# Patient Record
Sex: FEMALE | NOT HISPANIC OR LATINO | URBAN - METROPOLITAN AREA
[De-identification: names, ages, dates, MRNs, and addresses within clinical notes are randomized per-mention and may not be internally consistent; named-entity substitution may affect disease eponyms.]

---

## 2017-06-12 ENCOUNTER — IMPORTED ENCOUNTER (OUTPATIENT)
Dept: URBAN - METROPOLITAN AREA CLINIC 1 | Facility: CLINIC | Age: 72
End: 2017-06-12

## 2017-06-12 PROBLEM — E11.9: Noted: 2017-06-12

## 2017-06-12 PROBLEM — H40.023: Noted: 2017-06-12

## 2017-06-12 PROBLEM — H25.813: Noted: 2017-06-12

## 2017-06-12 PROBLEM — H04.123: Noted: 2017-06-12

## 2017-06-12 PROBLEM — Z79.4: Noted: 2017-06-12

## 2017-06-12 PROCEDURE — 92014 COMPRE OPH EXAM EST PT 1/>: CPT

## 2017-06-12 NOTE — PATIENT DISCUSSION
1.  DM Type II (on insulin) without sign of diabetic retinopathy and no blot heme on dilated retinal examination today OU No Macular Edema:  Discussed the pathophysiology of diabetes and its effect on the eye and risk of blindness. Stressed the importance of strong glucose control. Advised of importance of at least yearly dilated examinations but to contact us immediately for any problems or concerns. 2. Type II Insulin dependent diabetes mellitus3. Cataract OU: Observe for now without intervention. The patient was advised to contact us if any change or worsening of vision4. Glaucoma Suspect OU :(.7 OD/ .75 OS) Past w/u negative. Patient is considered high risk. Condition was discussed with patient and patient understands. Will continue to monitor patient for any progression in condition. Patient was advised to call us with any problems questions or concerns. 5.  Dry Eyes OU -The continuation of artificial tears were recommended. 6.  Return for an appointment for 6mo/10 glare with Dr. Reshma Gregorio.

## 2017-12-11 ENCOUNTER — IMPORTED ENCOUNTER (OUTPATIENT)
Dept: URBAN - METROPOLITAN AREA CLINIC 1 | Facility: CLINIC | Age: 72
End: 2017-12-11

## 2017-12-11 PROBLEM — H25.813: Noted: 2017-12-11

## 2017-12-11 PROBLEM — E11.9: Noted: 2017-12-11

## 2017-12-11 PROBLEM — H40.013: Noted: 2017-12-11

## 2017-12-11 PROBLEM — H04.123: Noted: 2017-12-11

## 2017-12-11 PROCEDURE — 92012 INTRM OPH EXAM EST PATIENT: CPT

## 2017-12-11 NOTE — PATIENT DISCUSSION
1.  Cataract OU: Observe for now without intervention. The patient was advised to contact us if any change or worsening of vision2. Glaucoma Suspect OU (0.7/0.75/ (-) FHX): Stable IOP and C/D OU. Past w/u (-). Patient is considered Low Risk. Condition was discussed with patient and patient understands. Will continue to monitor patient for any progression in condition. Patient was advised to call us with any problems questions or concerns. 3.  Dry Eyes OU- Controlled. The continuation of artificial tears were recommended. 4.  DM Type II (on insulin) without sign of diabetic retinopathy and no blot heme on dilated retinal examination today OU No Macular Edema: Stable. Discussed the pathophysiology of diabetes and its effect on the eye and risk of blindness. Stressed the importance of strong glucose control. Advised of importance of at least yearly dilated examinations but to contact us immediately for any problems or concerns. 5. Return for an appointment for a 30/OCT/glare in 6 months with Dr. Stephanie Geronimo.

## 2018-06-12 ENCOUNTER — IMPORTED ENCOUNTER (OUTPATIENT)
Dept: URBAN - METROPOLITAN AREA CLINIC 1 | Facility: CLINIC | Age: 73
End: 2018-06-12

## 2018-06-12 PROBLEM — E11.9: Noted: 2018-06-12

## 2018-06-12 PROBLEM — H40.013: Noted: 2018-06-12

## 2018-06-12 PROBLEM — Z79.4: Noted: 2018-06-12

## 2018-06-12 PROBLEM — H25.813: Noted: 2018-06-12

## 2018-06-12 PROCEDURE — 92014 COMPRE OPH EXAM EST PT 1/>: CPT

## 2018-06-12 PROCEDURE — 92133 CPTRZD OPH DX IMG PST SGM ON: CPT

## 2018-06-12 PROCEDURE — 92015 DETERMINE REFRACTIVE STATE: CPT

## 2018-06-12 NOTE — PATIENT DISCUSSION
Cataract OU:  Visually Significant discussed the risks benefits alternatives and limitations of cataract surgery. The patient stated a full understanding and a desire to proceed with the procedure. The patient will need to return for preop appointment with cataract measurements and to have any additional questions answered and start pre-operative eye drops as directed. Phaco PCLOtherwise follow-up

## 2018-06-12 NOTE — PATIENT DISCUSSION
1.  Cataract OU:  Visually Significant secondary to glare discussed the risks benefits alternatives and limitations of cataract surgery. The patient stated a full understanding and a desire to proceed with the procedure. The patient will need to return for preop appointment with cataract measurements and to have any additional questions answered and start pre-operative eye drops as directed. Phaco PCL OS then ODOtherwise follow-up in 6 months for a 10/glare2. DM Type II without sign of diabetic retinopathy and no blot heme on dilated retinal examination today OU No Macular Edema: Stable. Discussed the pathophysiology of diabetes and its effect on the eye and risk of blindness. Stressed the importance of strong glucose control. Advised of importance of at least yearly dilated examinations but to contact us immediately for any problems or concerns. 3. Type II Insulin dependent diabetes mellitus4. Glaucoma Suspect OU (0.7/0.75): Stable IOP and C/D OU. Normal OCT OU. Patient is considered Low Risk. Condition was discussed with patient and patient understands. Will continue to monitor patient for any progression in condition. Patient was advised to call us with any problems questions or concerns. 5.  Return for an appointment for [facility] H&P with Dr. Mary Cantor.

## 2018-08-24 ENCOUNTER — IMPORTED ENCOUNTER (OUTPATIENT)
Dept: URBAN - METROPOLITAN AREA CLINIC 1 | Facility: CLINIC | Age: 73
End: 2018-08-24

## 2018-08-24 PROBLEM — H25.812: Noted: 2018-08-24

## 2018-08-24 PROCEDURE — 92136 OPHTHALMIC BIOMETRY: CPT

## 2018-08-24 NOTE — PATIENT DISCUSSION
1. Cataract OS:  Visually Significant secondary to glare discussed the risks benefits alternatives and limitations of cataract surgery. The patient stated a full understanding and a desire to proceed with the procedure. Patient understands that may need OTC readers for near vision post phaco. Phaco PCL (Toric lens standard procedure) Return for an appointment for Return as scheduled with Dr. Kelton Park.

## 2018-09-05 ENCOUNTER — IMPORTED ENCOUNTER (OUTPATIENT)
Dept: URBAN - METROPOLITAN AREA CLINIC 1 | Facility: CLINIC | Age: 73
End: 2018-09-05

## 2018-09-06 ENCOUNTER — IMPORTED ENCOUNTER (OUTPATIENT)
Dept: URBAN - METROPOLITAN AREA CLINIC 1 | Facility: CLINIC | Age: 73
End: 2018-09-06

## 2018-09-06 PROBLEM — Z96.1: Noted: 2018-09-06

## 2018-09-06 PROCEDURE — 99024 POSTOP FOLLOW-UP VISIT: CPT

## 2018-09-06 NOTE — PATIENT DISCUSSION
1. POD#1 CE/IOL OS doing well. Continue all 3 gtts as prescribed and until gone. Use Post op Warnings Reiterated RTC as scheduled 2. Return for an appointment for Return as scheduled with Dr. Rashard Martins.

## 2018-09-11 ENCOUNTER — IMPORTED ENCOUNTER (OUTPATIENT)
Dept: URBAN - METROPOLITAN AREA CLINIC 1 | Facility: CLINIC | Age: 73
End: 2018-09-11

## 2018-09-11 PROBLEM — H25.811: Noted: 2018-09-11

## 2018-09-11 PROBLEM — Z96.1: Noted: 2018-09-11

## 2018-09-11 PROCEDURE — 92136 OPHTHALMIC BIOMETRY: CPT

## 2018-09-11 NOTE — PATIENT DISCUSSION
1.  Cataract OD: Visually Significant secondary to glare discussed the risks benefits alternatives and limitations of cataract surgery. The patient stated a full understanding and a desire to proceed with the procedure. The patient will need to start pre-operative eye drops as directed. Proceed w/ phaco PCL OD w/ Toric lens Pt understands they will need glasses post-op to achieve their best corrected vision. 2.  POW#1  CE/IOL Toric OS doing well. Discontinue OcufloxContinue Lotemax/Durezol/Prednisolone BID until gone. Continue Prolensa/Ilevro/Acular QD until gone. 3. Return for an appointment for sx OD with Dr. Mendy Vallejo.

## 2018-09-19 ENCOUNTER — IMPORTED ENCOUNTER (OUTPATIENT)
Dept: URBAN - METROPOLITAN AREA CLINIC 1 | Facility: CLINIC | Age: 73
End: 2018-09-19

## 2018-09-20 ENCOUNTER — IMPORTED ENCOUNTER (OUTPATIENT)
Dept: URBAN - METROPOLITAN AREA CLINIC 1 | Facility: CLINIC | Age: 73
End: 2018-09-20

## 2018-09-20 PROBLEM — Z96.1: Noted: 2018-09-20

## 2018-09-20 PROCEDURE — 99024 POSTOP FOLLOW-UP VISIT: CPT

## 2018-09-20 NOTE — PATIENT DISCUSSION
1. POD#1 Phaco/ PCL OD (Toric)- doing well. Continue all 3 gtts as prescribed and until gone. Use Lotemax BID OD Prolensa Qdaily OD Ocuflox TID OD Post op Warnings Reiterated 2. POW#2 Phaco/ PCL OS (Toric)- doing well Continue all 3 gtts as prescribed and until gone.  Use Lotemax BID OS till out Use Prolensa Qdaily OS till out Post op Warnings Reiterated RTC as scheduled

## 2018-10-12 ENCOUNTER — IMPORTED ENCOUNTER (OUTPATIENT)
Dept: URBAN - METROPOLITAN AREA CLINIC 1 | Facility: CLINIC | Age: 73
End: 2018-10-12

## 2018-10-12 PROBLEM — Z96.1: Noted: 2018-10-12

## 2018-10-12 PROCEDURE — 99024 POSTOP FOLLOW-UP VISIT: CPT

## 2018-10-12 NOTE — PATIENT DISCUSSION
1. POW#3 Phaco/ PCL OU (Toric OU) -- Doing well Finish PO meds per schedule Finalized MRx for glasses given to patient today @ N/C. Return for an appointment in June 2019 for a 30 OU with Dr. Donna Gayle.

## 2019-06-14 ENCOUNTER — IMPORTED ENCOUNTER (OUTPATIENT)
Dept: URBAN - METROPOLITAN AREA CLINIC 1 | Facility: CLINIC | Age: 74
End: 2019-06-14

## 2019-06-14 PROBLEM — Z79.4: Noted: 2019-06-14

## 2019-06-14 PROBLEM — H26.493: Noted: 2019-06-14

## 2019-06-14 PROBLEM — Z96.1: Noted: 2019-06-14

## 2019-06-14 PROBLEM — Z79.84: Noted: 2019-06-14

## 2019-06-14 PROBLEM — E11.9: Noted: 2019-06-14

## 2019-06-14 PROBLEM — H04.123: Noted: 2019-06-14

## 2019-06-14 PROCEDURE — 92014 COMPRE OPH EXAM EST PT 1/>: CPT

## 2019-06-14 NOTE — PATIENT DISCUSSION
1.  DM Type II (IDDM) without sign of diabetic retinopathy and no blot heme on dilated retinal examination today OU No Macular Edema:  Discussed the pathophysiology of diabetes and its effect on the eye and risk of blindness. Stressed the importance of strong glucose control. Advised of importance of at least yearly dilated examinations but to contact us immediately for any problems or concerns. 2. PCO OU: (Posterior Capsule Opacification)   Observe and consider yag cap when pt feels pco visually significant and visual acuity decreases to appropriate level. 3. Dry Eyes OU -The use/continuation of artificial tears were recommended. 4.  Pseudophakia OU - Toric OU. Doing well. Patient defers Glasses MRx today. Letter to PCP. Return for an appointment in 1 year for a 30/glare with Dr. Meredith Gregory.

## 2020-06-16 ENCOUNTER — IMPORTED ENCOUNTER (OUTPATIENT)
Dept: URBAN - METROPOLITAN AREA CLINIC 1 | Facility: CLINIC | Age: 75
End: 2020-06-16

## 2020-06-16 PROBLEM — Z96.1: Noted: 2020-06-16

## 2020-06-16 PROBLEM — Z79.4: Noted: 2020-06-16

## 2020-06-16 PROBLEM — H40.013: Noted: 2020-06-16

## 2020-06-16 PROBLEM — H26.493: Noted: 2020-06-16

## 2020-06-16 PROBLEM — E11.9: Noted: 2020-06-16

## 2020-06-16 PROBLEM — H04.123: Noted: 2020-06-16

## 2020-06-16 PROCEDURE — 92014 COMPRE OPH EXAM EST PT 1/>: CPT

## 2020-06-16 NOTE — PATIENT DISCUSSION
1.  DM Type II (Insulin) without sign of diabetic retinopathy and no blot heme on dilated retinal examination today OU No Macular Edema:  Discussed the pathophysiology of diabetes and its effect on the eye and risk of blindness. Stressed the importance of strong glucose control. Advised of importance of at least yearly dilated examinations but to contact us immediately for any problems or concerns. 2. PCO OU: (Posterior Capsule Opacification)   Observe and consider yag cap when pt feels pco visually significant and visual acuity decreases to appropriate level. 3. Dry Eyes OU - The use/continuation of artificial tears were recommended. 4.  Pseudophakia OU - Toric OU. Doing well. 5.  Glaucoma Suspect OU (0.650.7) IOP stable. Past w/u negative. Patient is considered Low Risk. Condition was discussed with patient and patient understands. Will continue to monitor patient for any progression in condition. Patient was advised to call us with any problems questions or concerns. Letter to PCP. Return for an appointment in 1 year for a 30/glare with Dr. Jessi Hein.

## 2021-06-22 ENCOUNTER — IMPORTED ENCOUNTER (OUTPATIENT)
Dept: URBAN - METROPOLITAN AREA CLINIC 1 | Facility: CLINIC | Age: 76
End: 2021-06-22

## 2021-06-22 PROBLEM — H04.123: Noted: 2021-06-22

## 2021-06-22 PROBLEM — H40.013: Noted: 2021-06-22

## 2021-06-22 PROBLEM — H35.372: Noted: 2021-06-22

## 2021-06-22 PROBLEM — H26.493: Noted: 2021-06-22

## 2021-06-22 PROBLEM — Z79.4: Noted: 2021-06-22

## 2021-06-22 PROBLEM — E11.9: Noted: 2021-06-22

## 2021-06-22 PROBLEM — Z79.84: Noted: 2021-06-22

## 2021-06-22 PROCEDURE — 99214 OFFICE O/P EST MOD 30 MIN: CPT

## 2021-06-22 NOTE — PATIENT DISCUSSION
1.  DM Type II (Insulin) without sign of diabetic retinopathy and no blot heme on dilated retinal examination today OU No Macular Edema:  Discussed the pathophysiology of diabetes and its effect on the eye and risk of blindness. Stressed the importance of strong glucose control. Advised of importance of at least yearly dilated examinations but to contact us immediately for any problems or concerns. 2. PCO OU -- (Posterior Capsule Opacification)   Observe and consider yag cap when pt feels pco visually significant and visual acuity decreases to appropriate level. 3. Epiretinal Membrane OS -- Observe for change. 4. Dry Eyes OU -- The use/continuation of artificial tears were recommended. 5.  Pseudophakia OU -- Toric OU. Doing well. 6.  Glaucoma Suspect OU (0.650.7) IOP stable. Past w/u negative. Patient is considered Low Risk. Condition was discussed with patient and patient understands. Will continue to monitor patient for any progression in condition. Patient was advised to call us with any problems questions or concerns. Return for an appointment in 1 year for a 30/glare with Dr. Martín Rivera.

## 2022-04-02 ASSESSMENT — VISUAL ACUITY
OS_GLARE: 20/400
OS_SC: 20/30
OS_CC: 20/30-2
OS_CC: 20/25
OD_CC: 20/20
OS_GLARE: 20/70
OD_GLARE: 20/400
OD_SC: 20/30
OS_CC: 20/25
OS_CC: 20/25
OD_CC: 20/25
OS_GLARE: 20/400
OS_SC: 20/40
OD_GLARE: 20/400
OD_GLARE: 20/400
OS_GLARE: 20/400
OD_CC: 20/25
OS_SC: 20/30
OS_GLARE: 20/40
OD_GLARE: 20/60
OD_SC: 20/40
OD_SC: 20/40
OD_CC: 20/25
OD_CC: 20/20
OS_SC: 20/40
OD_SC: 20/40
OD_GLARE: 20/30
OS_CC: 20/20
OS_CC: 20/25
OS_CC: 20/20-1

## 2022-04-02 ASSESSMENT — TONOMETRY
OS_IOP_MMHG: 12
OS_IOP_MMHG: 12
OD_IOP_MMHG: 13
OS_IOP_MMHG: 17
OS_IOP_MMHG: 12
OS_IOP_MMHG: 16
OD_IOP_MMHG: 18
OS_IOP_MMHG: 14
OS_IOP_MMHG: 18
OS_IOP_MMHG: 13
OS_IOP_MMHG: 14
OD_IOP_MMHG: 16
OS_IOP_MMHG: 16
OD_IOP_MMHG: 17
OD_IOP_MMHG: 12
OD_IOP_MMHG: 13
OS_IOP_MMHG: 13
OD_IOP_MMHG: 14
OD_IOP_MMHG: 15
OD_IOP_MMHG: 12
OD_IOP_MMHG: 12

## 2022-06-14 ENCOUNTER — COMPREHENSIVE EXAM (OUTPATIENT)
Dept: URBAN - METROPOLITAN AREA CLINIC 1 | Facility: CLINIC | Age: 77
End: 2022-06-14

## 2022-06-14 DIAGNOSIS — H04.123: ICD-10-CM

## 2022-06-14 DIAGNOSIS — H35.372: ICD-10-CM

## 2022-06-14 DIAGNOSIS — Z96.1: ICD-10-CM

## 2022-06-14 DIAGNOSIS — H26.493: ICD-10-CM

## 2022-06-14 DIAGNOSIS — H40.013: ICD-10-CM

## 2022-06-14 DIAGNOSIS — E11.3291: ICD-10-CM

## 2022-06-14 DIAGNOSIS — Z79.4: ICD-10-CM

## 2022-06-14 PROCEDURE — 92014 COMPRE OPH EXAM EST PT 1/>: CPT

## 2022-06-14 ASSESSMENT — VISUAL ACUITY
OS_BAT: 20/70
OS_SC: 20/20
OD_SC: 20/25
OD_BAT: 20/60

## 2022-06-14 ASSESSMENT — TONOMETRY
OD_IOP_MMHG: 16
OS_IOP_MMHG: 16

## 2022-06-14 NOTE — PATIENT DISCUSSION
(0.65/0.7) IOP stable. Patient is considered low risk. Condition was discussed with patient and patient understands. Will continue to monitor patient for any progression in condition. Patient was advised to call us with any problems, questions, or concerns.

## 2022-06-14 NOTE — PATIENT DISCUSSION
No Macular Edema:  Discussed the pathophysiology of diabetes and its effect on the eye and risk of blindness. Stressed the importance of strong glucose control. Advised the importance of at least yearly dilated examinations, but to contact us immediately for any problems or concerns.

## 2023-05-15 ENCOUNTER — EMERGENCY VISIT (OUTPATIENT)
Dept: URBAN - METROPOLITAN AREA CLINIC 1 | Facility: CLINIC | Age: 78
End: 2023-05-15

## 2023-05-15 DIAGNOSIS — H43.812: ICD-10-CM

## 2023-05-15 PROCEDURE — 99213 OFFICE O/P EST LOW 20 MIN: CPT

## 2023-05-15 ASSESSMENT — VISUAL ACUITY
OS_SC: 20/25-2
OD_SC: 20/30+2

## 2023-05-15 ASSESSMENT — TONOMETRY
OS_IOP_MMHG: 15
OD_IOP_MMHG: 14

## 2023-12-04 ENCOUNTER — COMPREHENSIVE EXAM (OUTPATIENT)
Dept: URBAN - METROPOLITAN AREA CLINIC 1 | Facility: CLINIC | Age: 78
End: 2023-12-04

## 2023-12-04 DIAGNOSIS — H40.013: ICD-10-CM

## 2023-12-04 DIAGNOSIS — Z79.4: ICD-10-CM

## 2023-12-04 DIAGNOSIS — H26.493: ICD-10-CM

## 2023-12-04 DIAGNOSIS — H43.812: ICD-10-CM

## 2023-12-04 DIAGNOSIS — H35.372: ICD-10-CM

## 2023-12-04 DIAGNOSIS — E11.3291: ICD-10-CM

## 2023-12-04 PROCEDURE — 99214 OFFICE O/P EST MOD 30 MIN: CPT

## 2023-12-04 ASSESSMENT — VISUAL ACUITY
OD_SC: 20/20-1
OS_BAT: 20/70
OS_CC: J1
OD_BAT: 20/60
OS_SC: 20/25
OD_CC: J1

## 2023-12-04 ASSESSMENT — TONOMETRY
OD_IOP_MMHG: 15
OS_IOP_MMHG: 15

## 2024-08-23 ENCOUNTER — EMERGENCY VISIT (OUTPATIENT)
Dept: URBAN - METROPOLITAN AREA CLINIC 2 | Facility: CLINIC | Age: 79
End: 2024-08-23

## 2024-08-23 DIAGNOSIS — H00.012: ICD-10-CM

## 2024-08-23 DIAGNOSIS — H02.862: ICD-10-CM

## 2024-08-23 PROCEDURE — 92012 INTRM OPH EXAM EST PATIENT: CPT

## 2024-08-23 ASSESSMENT — TONOMETRY
OS_IOP_MMHG: 14
OD_IOP_MMHG: 14

## 2024-08-23 ASSESSMENT — VISUAL ACUITY
OS_SC: 20/30
OD_SC: 20/25

## 2024-12-05 ENCOUNTER — COMPREHENSIVE EXAM (OUTPATIENT)
Age: 79
End: 2024-12-05

## 2024-12-05 DIAGNOSIS — H26.493: ICD-10-CM

## 2024-12-05 DIAGNOSIS — H43.812: ICD-10-CM

## 2024-12-05 DIAGNOSIS — Z79.4: ICD-10-CM

## 2024-12-05 DIAGNOSIS — H35.372: ICD-10-CM

## 2024-12-05 DIAGNOSIS — E11.3293: ICD-10-CM

## 2024-12-05 DIAGNOSIS — H40.013: ICD-10-CM

## 2024-12-05 DIAGNOSIS — H04.123: ICD-10-CM

## 2024-12-05 DIAGNOSIS — Z96.1: ICD-10-CM

## 2024-12-05 DIAGNOSIS — H02.862: ICD-10-CM

## 2024-12-05 PROCEDURE — 99214 OFFICE O/P EST MOD 30 MIN: CPT
